# Patient Record
Sex: FEMALE | Race: WHITE | NOT HISPANIC OR LATINO
[De-identification: names, ages, dates, MRNs, and addresses within clinical notes are randomized per-mention and may not be internally consistent; named-entity substitution may affect disease eponyms.]

---

## 2017-02-21 ENCOUNTER — ASOB RESULT (OUTPATIENT)
Age: 37
End: 2017-02-21

## 2017-02-21 ENCOUNTER — APPOINTMENT (OUTPATIENT)
Dept: ANTEPARTUM | Facility: CLINIC | Age: 37
End: 2017-02-21

## 2017-04-17 ENCOUNTER — APPOINTMENT (OUTPATIENT)
Dept: ANTEPARTUM | Facility: CLINIC | Age: 37
End: 2017-04-17

## 2017-04-17 ENCOUNTER — ASOB RESULT (OUTPATIENT)
Age: 37
End: 2017-04-17

## 2017-09-01 ENCOUNTER — INPATIENT (INPATIENT)
Facility: HOSPITAL | Age: 37
LOS: 1 days | Discharge: HOME | End: 2017-09-03
Attending: OBSTETRICS & GYNECOLOGY | Admitting: OBSTETRICS & GYNECOLOGY
Payer: COMMERCIAL

## 2017-09-01 VITALS
HEART RATE: 86 BPM | SYSTOLIC BLOOD PRESSURE: 114 MMHG | TEMPERATURE: 98 F | RESPIRATION RATE: 18 BRPM | DIASTOLIC BLOOD PRESSURE: 71 MMHG

## 2017-09-01 DIAGNOSIS — O26.899 OTHER SPECIFIED PREGNANCY RELATED CONDITIONS, UNSPECIFIED TRIMESTER: ICD-10-CM

## 2017-09-01 DIAGNOSIS — Z3A.00 WEEKS OF GESTATION OF PREGNANCY NOT SPECIFIED: ICD-10-CM

## 2017-09-01 DIAGNOSIS — Z34.80 ENCOUNTER FOR SUPERVISION OF OTHER NORMAL PREGNANCY, UNSPECIFIED TRIMESTER: ICD-10-CM

## 2017-09-01 LAB
BASOPHILS # BLD AUTO: 0 K/UL — SIGNIFICANT CHANGE UP (ref 0–0.2)
BASOPHILS NFR BLD AUTO: 0.4 % — SIGNIFICANT CHANGE UP (ref 0–2)
BLD GP AB SCN SERPL QL: NEGATIVE — SIGNIFICANT CHANGE UP
EOSINOPHIL # BLD AUTO: 0 K/UL — SIGNIFICANT CHANGE UP (ref 0–0.5)
EOSINOPHIL NFR BLD AUTO: 0.1 % — SIGNIFICANT CHANGE UP (ref 0–6)
HCT VFR BLD CALC: 35.9 % — SIGNIFICANT CHANGE UP (ref 34.5–45)
HCV AB S/CO SERPL IA: 0.78 S/CO — SIGNIFICANT CHANGE UP
HCV AB SERPL-IMP: SIGNIFICANT CHANGE UP
HGB BLD-MCNC: 12 G/DL — SIGNIFICANT CHANGE UP (ref 11.5–15.5)
LYMPHOCYTES # BLD AUTO: 0.9 K/UL — LOW (ref 1–3.3)
LYMPHOCYTES # BLD AUTO: 9.1 % — LOW (ref 13–44)
MCHC RBC-ENTMCNC: 29.2 PG — SIGNIFICANT CHANGE UP (ref 27–34)
MCHC RBC-ENTMCNC: 33.4 GM/DL — SIGNIFICANT CHANGE UP (ref 32–36)
MCV RBC AUTO: 87.5 FL — SIGNIFICANT CHANGE UP (ref 80–100)
MONOCYTES # BLD AUTO: 0.5 K/UL — SIGNIFICANT CHANGE UP (ref 0–0.9)
MONOCYTES NFR BLD AUTO: 4.6 % — SIGNIFICANT CHANGE UP (ref 2–14)
NEUTROPHILS # BLD AUTO: 8.9 K/UL — HIGH (ref 1.8–7.4)
NEUTROPHILS NFR BLD AUTO: 85.7 % — HIGH (ref 43–77)
PLATELET # BLD AUTO: 179 K/UL — SIGNIFICANT CHANGE UP (ref 150–400)
RBC # BLD: 4.11 M/UL — SIGNIFICANT CHANGE UP (ref 3.8–5.2)
RBC # FLD: 12.5 % — SIGNIFICANT CHANGE UP (ref 10.3–14.5)
RH IG SCN BLD-IMP: POSITIVE — SIGNIFICANT CHANGE UP
T PALLIDUM AB TITR SER: NEGATIVE — SIGNIFICANT CHANGE UP
WBC # BLD: 10.3 K/UL — SIGNIFICANT CHANGE UP (ref 3.8–10.5)
WBC # FLD AUTO: 10.3 K/UL — SIGNIFICANT CHANGE UP (ref 3.8–10.5)

## 2017-09-01 RX ORDER — CITRIC ACID/SODIUM CITRATE 300-500 MG
15 SOLUTION, ORAL ORAL EVERY 4 HOURS
Qty: 0 | Refills: 0 | Status: DISCONTINUED | OUTPATIENT
Start: 2017-09-01 | End: 2017-09-01

## 2017-09-01 RX ORDER — IBUPROFEN 200 MG
600 TABLET ORAL EVERY 6 HOURS
Qty: 0 | Refills: 0 | Status: COMPLETED | OUTPATIENT
Start: 2017-09-01 | End: 2018-07-31

## 2017-09-01 RX ORDER — OXYTOCIN 10 UNIT/ML
41.67 VIAL (ML) INJECTION
Qty: 20 | Refills: 0 | Status: DISCONTINUED | OUTPATIENT
Start: 2017-09-01 | End: 2017-09-01

## 2017-09-01 RX ORDER — SIMETHICONE 80 MG/1
80 TABLET, CHEWABLE ORAL EVERY 6 HOURS
Qty: 0 | Refills: 0 | Status: DISCONTINUED | OUTPATIENT
Start: 2017-09-01 | End: 2017-09-03

## 2017-09-01 RX ORDER — ACETAMINOPHEN 500 MG
975 TABLET ORAL EVERY 6 HOURS
Qty: 0 | Refills: 0 | Status: COMPLETED | OUTPATIENT
Start: 2017-09-01 | End: 2018-07-31

## 2017-09-01 RX ORDER — PRAMOXINE HYDROCHLORIDE 150 MG/15G
1 AEROSOL, FOAM RECTAL EVERY 4 HOURS
Qty: 0 | Refills: 0 | Status: DISCONTINUED | OUTPATIENT
Start: 2017-09-01 | End: 2017-09-01

## 2017-09-01 RX ORDER — LANOLIN
1 OINTMENT (GRAM) TOPICAL EVERY 6 HOURS
Qty: 0 | Refills: 0 | Status: DISCONTINUED | OUTPATIENT
Start: 2017-09-01 | End: 2017-09-03

## 2017-09-01 RX ORDER — OXYCODONE HYDROCHLORIDE 5 MG/1
5 TABLET ORAL EVERY 4 HOURS
Qty: 0 | Refills: 0 | Status: DISCONTINUED | OUTPATIENT
Start: 2017-09-01 | End: 2017-09-03

## 2017-09-01 RX ORDER — ACETAMINOPHEN 500 MG
975 TABLET ORAL EVERY 6 HOURS
Qty: 0 | Refills: 0 | Status: DISCONTINUED | OUTPATIENT
Start: 2017-09-01 | End: 2017-09-02

## 2017-09-01 RX ORDER — HYDROCORTISONE 1 %
1 OINTMENT (GRAM) TOPICAL EVERY 4 HOURS
Qty: 0 | Refills: 0 | Status: DISCONTINUED | OUTPATIENT
Start: 2017-09-01 | End: 2017-09-03

## 2017-09-01 RX ORDER — SODIUM CHLORIDE 9 MG/ML
1000 INJECTION, SOLUTION INTRAVENOUS
Qty: 0 | Refills: 0 | Status: DISCONTINUED | OUTPATIENT
Start: 2017-09-01 | End: 2017-09-01

## 2017-09-01 RX ORDER — GLYCERIN ADULT
1 SUPPOSITORY, RECTAL RECTAL AT BEDTIME
Qty: 0 | Refills: 0 | Status: DISCONTINUED | OUTPATIENT
Start: 2017-09-01 | End: 2017-09-03

## 2017-09-01 RX ORDER — PRAMOXINE HYDROCHLORIDE 150 MG/15G
1 AEROSOL, FOAM RECTAL EVERY 4 HOURS
Qty: 0 | Refills: 0 | Status: DISCONTINUED | OUTPATIENT
Start: 2017-09-01 | End: 2017-09-03

## 2017-09-01 RX ORDER — DIPHENHYDRAMINE HCL 50 MG
25 CAPSULE ORAL EVERY 6 HOURS
Qty: 0 | Refills: 0 | Status: DISCONTINUED | OUTPATIENT
Start: 2017-09-01 | End: 2017-09-03

## 2017-09-01 RX ORDER — HYDROCORTISONE 1 %
1 OINTMENT (GRAM) TOPICAL EVERY 4 HOURS
Qty: 0 | Refills: 0 | Status: DISCONTINUED | OUTPATIENT
Start: 2017-09-01 | End: 2017-09-01

## 2017-09-01 RX ORDER — ACETAMINOPHEN 500 MG
975 TABLET ORAL EVERY 6 HOURS
Qty: 0 | Refills: 0 | Status: DISCONTINUED | OUTPATIENT
Start: 2017-09-01 | End: 2017-09-01

## 2017-09-01 RX ORDER — IBUPROFEN 200 MG
600 TABLET ORAL EVERY 6 HOURS
Qty: 0 | Refills: 0 | Status: DISCONTINUED | OUTPATIENT
Start: 2017-09-01 | End: 2017-09-01

## 2017-09-01 RX ORDER — SODIUM CHLORIDE 9 MG/ML
3 INJECTION INTRAMUSCULAR; INTRAVENOUS; SUBCUTANEOUS EVERY 8 HOURS
Qty: 0 | Refills: 0 | Status: DISCONTINUED | OUTPATIENT
Start: 2017-09-01 | End: 2017-09-03

## 2017-09-01 RX ORDER — DIBUCAINE 1 %
1 OINTMENT (GRAM) RECTAL EVERY 4 HOURS
Qty: 0 | Refills: 0 | Status: DISCONTINUED | OUTPATIENT
Start: 2017-09-01 | End: 2017-09-01

## 2017-09-01 RX ORDER — TETANUS TOXOID, REDUCED DIPHTHERIA TOXOID AND ACELLULAR PERTUSSIS VACCINE, ADSORBED 5; 2.5; 8; 8; 2.5 [IU]/.5ML; [IU]/.5ML; UG/.5ML; UG/.5ML; UG/.5ML
0.5 SUSPENSION INTRAMUSCULAR ONCE
Qty: 0 | Refills: 0 | Status: DISCONTINUED | OUTPATIENT
Start: 2017-09-01 | End: 2017-09-03

## 2017-09-01 RX ORDER — SODIUM CHLORIDE 9 MG/ML
500 INJECTION, SOLUTION INTRAVENOUS ONCE
Qty: 0 | Refills: 0 | Status: DISCONTINUED | OUTPATIENT
Start: 2017-09-01 | End: 2017-09-01

## 2017-09-01 RX ORDER — OXYCODONE HYDROCHLORIDE 5 MG/1
5 TABLET ORAL
Qty: 0 | Refills: 0 | Status: DISCONTINUED | OUTPATIENT
Start: 2017-09-01 | End: 2017-09-03

## 2017-09-01 RX ORDER — DIBUCAINE 1 %
1 OINTMENT (GRAM) RECTAL EVERY 4 HOURS
Qty: 0 | Refills: 0 | Status: DISCONTINUED | OUTPATIENT
Start: 2017-09-01 | End: 2017-09-03

## 2017-09-01 RX ORDER — AER TRAVELER 0.5 G/1
1 SOLUTION RECTAL; TOPICAL EVERY 4 HOURS
Qty: 0 | Refills: 0 | Status: DISCONTINUED | OUTPATIENT
Start: 2017-09-01 | End: 2017-09-03

## 2017-09-01 RX ORDER — IBUPROFEN 200 MG
600 TABLET ORAL EVERY 6 HOURS
Qty: 0 | Refills: 0 | Status: DISCONTINUED | OUTPATIENT
Start: 2017-09-01 | End: 2017-09-03

## 2017-09-01 RX ORDER — DOCUSATE SODIUM 100 MG
100 CAPSULE ORAL
Qty: 0 | Refills: 0 | Status: DISCONTINUED | OUTPATIENT
Start: 2017-09-01 | End: 2017-09-03

## 2017-09-01 RX ORDER — OXYTOCIN 10 UNIT/ML
41.67 VIAL (ML) INJECTION
Qty: 20 | Refills: 0 | Status: DISCONTINUED | OUTPATIENT
Start: 2017-09-01 | End: 2017-09-03

## 2017-09-01 RX ORDER — KETOROLAC TROMETHAMINE 30 MG/ML
30 SYRINGE (ML) INJECTION ONCE
Qty: 0 | Refills: 0 | Status: DISCONTINUED | OUTPATIENT
Start: 2017-09-01 | End: 2017-09-03

## 2017-09-01 RX ORDER — IBUPROFEN 200 MG
600 TABLET ORAL ONCE
Qty: 0 | Refills: 0 | Status: DISCONTINUED | OUTPATIENT
Start: 2017-09-01 | End: 2017-09-01

## 2017-09-01 RX ORDER — AER TRAVELER 0.5 G/1
1 SOLUTION RECTAL; TOPICAL EVERY 4 HOURS
Qty: 0 | Refills: 0 | Status: DISCONTINUED | OUTPATIENT
Start: 2017-09-01 | End: 2017-09-01

## 2017-09-01 RX ORDER — SODIUM CHLORIDE 9 MG/ML
3 INJECTION INTRAMUSCULAR; INTRAVENOUS; SUBCUTANEOUS EVERY 8 HOURS
Qty: 0 | Refills: 0 | Status: DISCONTINUED | OUTPATIENT
Start: 2017-09-01 | End: 2017-09-01

## 2017-09-01 RX ORDER — MAGNESIUM HYDROXIDE 400 MG/1
30 TABLET, CHEWABLE ORAL
Qty: 0 | Refills: 0 | Status: DISCONTINUED | OUTPATIENT
Start: 2017-09-01 | End: 2017-09-03

## 2017-09-01 RX ORDER — OXYTOCIN 10 UNIT/ML
333.33 VIAL (ML) INJECTION
Qty: 20 | Refills: 0 | Status: DISCONTINUED | OUTPATIENT
Start: 2017-09-01 | End: 2017-09-01

## 2017-09-01 RX ADMIN — Medication 1 TABLET(S): at 11:59

## 2017-09-01 RX ADMIN — Medication 975 MILLIGRAM(S): at 17:48

## 2017-09-01 RX ADMIN — Medication 600 MILLIGRAM(S): at 23:40

## 2017-09-01 RX ADMIN — Medication 600 MILLIGRAM(S): at 12:30

## 2017-09-01 RX ADMIN — Medication 600 MILLIGRAM(S): at 11:59

## 2017-09-01 RX ADMIN — Medication 975 MILLIGRAM(S): at 08:16

## 2017-09-01 RX ADMIN — Medication 975 MILLIGRAM(S): at 23:40

## 2017-09-01 RX ADMIN — Medication 600 MILLIGRAM(S): at 17:12

## 2017-09-02 LAB
HCT VFR BLD CALC: 32.5 % — LOW (ref 34.5–45)
HGB BLD-MCNC: 11.1 G/DL — LOW (ref 11.5–15.5)

## 2017-09-02 RX ORDER — ACETAMINOPHEN 500 MG
975 TABLET ORAL EVERY 6 HOURS
Qty: 0 | Refills: 0 | Status: DISCONTINUED | OUTPATIENT
Start: 2017-09-02 | End: 2017-09-03

## 2017-09-02 RX ADMIN — Medication 600 MILLIGRAM(S): at 06:03

## 2017-09-02 RX ADMIN — Medication 600 MILLIGRAM(S): at 21:49

## 2017-09-02 RX ADMIN — Medication 1 TABLET(S): at 19:39

## 2017-09-02 RX ADMIN — Medication 975 MILLIGRAM(S): at 07:08

## 2017-09-02 RX ADMIN — Medication 975 MILLIGRAM(S): at 00:30

## 2017-09-02 RX ADMIN — Medication 975 MILLIGRAM(S): at 06:03

## 2017-09-02 RX ADMIN — Medication 600 MILLIGRAM(S): at 07:08

## 2017-09-02 RX ADMIN — Medication 975 MILLIGRAM(S): at 16:30

## 2017-09-02 RX ADMIN — Medication 975 MILLIGRAM(S): at 21:49

## 2017-09-02 RX ADMIN — Medication 600 MILLIGRAM(S): at 22:30

## 2017-09-02 RX ADMIN — Medication 600 MILLIGRAM(S): at 16:30

## 2017-09-02 RX ADMIN — Medication 975 MILLIGRAM(S): at 16:00

## 2017-09-02 RX ADMIN — Medication 975 MILLIGRAM(S): at 22:30

## 2017-09-02 RX ADMIN — Medication 600 MILLIGRAM(S): at 00:35

## 2017-09-02 RX ADMIN — Medication 600 MILLIGRAM(S): at 16:00

## 2017-09-02 NOTE — PROGRESS NOTE ADULT - SUBJECTIVE AND OBJECTIVE BOX
OB Progress Note:  PPD#1    S: 35yo   PPD#1 s/p . Patient feels well. Pain is well controlled. She is tolerating a regular diet and passing flatus. She is voiding spontaneously, and ambulating without difficulty. Denies CP/SOB. Denies lightheadedness/dizziness. Denies N/V.    O:  Vitals:  Vital Signs Last 24 Hrs  T(C): 36.6 (01 Sep 2017 21:45), Max: 37 (01 Sep 2017 08:40)  T(F): 97.9 (01 Sep 2017 21:45), Max: 98.6 (01 Sep 2017 08:40)  HR: 76 (01 Sep 2017 21:45) (68 - 87)  BP: 114/71 (01 Sep 2017 21:45) (103/65 - 116/65)  BP(mean): --  RR: 18 (01 Sep 2017 21:45) (16 - 20)  SpO2: 98% (01 Sep 2017 14:12) (98% - 98%)    MEDICATIONS  (STANDING):  sodium chloride 0.9% lock flush 3 milliLiter(s) IV Push every 8 hours  diphtheria/tetanus/pertussis (acellular) Vaccine (ADAcel) 0.5 milliLiter(s) IntraMuscular once  oxytocin Infusion 41.667 milliUNIT(s)/Min (125 mL/Hr) IV Continuous <Continuous>  prenatal multivitamin 1 Tablet(s) Oral daily  ketorolac   Injectable 30 milliGRAM(s) IV Push once  oxyCODONE    IR 5 milliGRAM(s) Oral every 3 hours  ibuprofen  Suspension 600 milliGRAM(s) Oral every 6 hours  acetaminophen   Tablet. 975 milliGRAM(s) Oral every 6 hours      Labs:  Blood type: O Positive  Rubella IgG: RPR: Negative                          12.0   10.3 >-----------< 179    (  @ 07:43 )             35.9                  Physical Exam:  General: NAD  Abdomen: soft, non-tender, non-distended, fundus firm  Vaginal: Lochia wnl  Extremities: No erythema/edema        Amanda Ponce PGY-1

## 2017-09-03 ENCOUNTER — TRANSCRIPTION ENCOUNTER (OUTPATIENT)
Age: 37
End: 2017-09-03

## 2017-09-03 VITALS
RESPIRATION RATE: 18 BRPM | DIASTOLIC BLOOD PRESSURE: 62 MMHG | SYSTOLIC BLOOD PRESSURE: 98 MMHG | TEMPERATURE: 98 F | HEART RATE: 77 BPM

## 2017-09-03 PROCEDURE — 86850 RBC ANTIBODY SCREEN: CPT

## 2017-09-03 PROCEDURE — 59050 FETAL MONITOR W/REPORT: CPT

## 2017-09-03 PROCEDURE — 86780 TREPONEMA PALLIDUM: CPT

## 2017-09-03 PROCEDURE — 59025 FETAL NON-STRESS TEST: CPT

## 2017-09-03 PROCEDURE — 85018 HEMOGLOBIN: CPT

## 2017-09-03 PROCEDURE — 86803 HEPATITIS C AB TEST: CPT

## 2017-09-03 PROCEDURE — G0463: CPT

## 2017-09-03 PROCEDURE — 85027 COMPLETE CBC AUTOMATED: CPT

## 2017-09-03 PROCEDURE — 86901 BLOOD TYPING SEROLOGIC RH(D): CPT

## 2017-09-03 PROCEDURE — 86900 BLOOD TYPING SEROLOGIC ABO: CPT

## 2017-09-03 RX ADMIN — Medication 600 MILLIGRAM(S): at 06:31

## 2017-09-03 RX ADMIN — Medication 1 TABLET(S): at 12:01

## 2017-09-03 RX ADMIN — Medication 975 MILLIGRAM(S): at 12:23

## 2017-09-03 RX ADMIN — Medication 600 MILLIGRAM(S): at 11:58

## 2017-09-03 RX ADMIN — Medication 975 MILLIGRAM(S): at 06:30

## 2017-09-03 RX ADMIN — Medication 600 MILLIGRAM(S): at 06:53

## 2017-09-03 RX ADMIN — Medication 975 MILLIGRAM(S): at 06:53

## 2017-09-03 RX ADMIN — Medication 600 MILLIGRAM(S): at 12:24

## 2017-09-03 RX ADMIN — Medication 975 MILLIGRAM(S): at 11:58

## 2017-09-03 NOTE — DISCHARGE NOTE OB - CARE PROVIDER_API CALL
Mata Patiño (MD), Obstetrics and Gynecology  [address] [place] [address]  Phone: [phone]  Fax: [phone]

## 2017-09-03 NOTE — DISCHARGE NOTE OB - MATERIALS PROVIDED
Guide to Postpartum Care/Tdap Vaccination (VIS Pub Date: 2012)/Back To Sleep Handout/Shaken Baby Prevention Handout/Birth Certificate Instructions/Breastfeeding Guide and Packet/Breastfeeding Log/Breastfeeding Mother’s Support Group Information/[facility] Hearing Screen Program/[facility]  Screening Program

## 2017-09-03 NOTE — PROGRESS NOTE ADULT - ATTENDING COMMENTS
Stable for dc home  Instruc reviewed  F.u 6 wks
Doing well, may want early dc home  Stable for dc home, instruc reviewed, f/u 6 wks.

## 2017-09-03 NOTE — PROGRESS NOTE ADULT - SUBJECTIVE AND OBJECTIVE BOX
PPD#2- ATTENDING NOTE    S: Patient doing well. Minimal lochia. Pain controlled.    O: Vital Signs Last 24 Hrs  T(C): 36.7 (03 Sep 2017 05:00), Max: 36.8 (02 Sep 2017 18:00)  T(F): 98.1 (03 Sep 2017 05:00), Max: 98.2 (02 Sep 2017 18:00)  HR: 76 (03 Sep 2017 05:00) (76 - 84)  BP: 92/60 (03 Sep 2017 05:00) (92/60 - 100/61)  BP(mean): --  RR: 18 (03 Sep 2017 05:00) (18 - 18)  SpO2: --    Gen: NAD  Abd: soft, NT, ND, fundus firm below umbilicus  Lochia: moderate  Ext: no tenderness, no hyper reflexia    Labs:                          11.1   x     )-----------( x        ( 02 Sep 2017 06:49 )             32.5       A: 36y PPD# s/p  doing well.    Plan:  Analgesia prn  Regular diet  Discharge instruction given  F/U 6 weeks

## 2017-09-03 NOTE — DISCHARGE NOTE OB - CARE PLAN
Principal Discharge DX:	Vaginal delivery  Goal:	Recovery  Instructions for follow-up, activity and diet:	As Discussed

## 2017-09-03 NOTE — DISCHARGE NOTE OB - PATIENT PORTAL LINK FT
“You can access the [facility] Patient Portal, offered by [facility], by registering with the following website: [email or web address]”

## 2020-11-24 ENCOUNTER — TRANSCRIPTION ENCOUNTER (OUTPATIENT)
Age: 40
End: 2020-11-24

## 2023-11-21 NOTE — PATIENT PROFILE OB - BABY SUPPLIES, OB PROFILE
The patient reports that she has fewer was Friday and Saturday for her grandfather, who is death was not unexpected, and for her nephew, who  very unexpectedly in a motor vehicle accident.  She indicates that since the death of her son years ago, issues of deaths and funerals have been triggers for anxiety.  She reports in the past having been prescribed Xanax for such events; she says that it was helpful with no side effects.    No depression, terence, psychosis, feelings of aggression, or thoughts of harm to self or others.  She is pleasant, appreciative, and fully appropriate on the phone.    Discussion regarding rationale, risks, and side effects of Xanax, including that common side effects of benzodiazepines are mood changes, drowsiness, impaired coordination, impaired attention, blackouts, and memory loss. Do NOT to operate a vehicle or machinery untiil you are sure how the medication will affect you (the patient reports that she will not be driving).  Significant danger exists in mixing this medication with alcohol or other sedating compounds. The medication is a controlled substance and has the potential for abuse, addiction, and withdrawal, the latter of which can result in seizures, confusion, psychosis, other morbidities, and death.  The patient requests the medication.  0.5 mg twice daily as needed for anxiety, for a short course.       none